# Patient Record
Sex: FEMALE | Race: WHITE | NOT HISPANIC OR LATINO | Employment: UNEMPLOYED | URBAN - METROPOLITAN AREA
[De-identification: names, ages, dates, MRNs, and addresses within clinical notes are randomized per-mention and may not be internally consistent; named-entity substitution may affect disease eponyms.]

---

## 2017-12-04 PROBLEM — Z20.2 VENEREAL DISEASE CONTACT: Status: ACTIVE | Noted: 2017-12-04

## 2019-02-01 ENCOUNTER — HOSPITAL ENCOUNTER (EMERGENCY)
Facility: HOSPITAL | Age: 24
Discharge: HOME | End: 2019-02-01
Attending: EMERGENCY MEDICINE
Payer: MEDICAID

## 2019-02-01 VITALS
DIASTOLIC BLOOD PRESSURE: 90 MMHG | BODY MASS INDEX: 24.55 KG/M2 | SYSTOLIC BLOOD PRESSURE: 109 MMHG | WEIGHT: 130 LBS | TEMPERATURE: 99 F | OXYGEN SATURATION: 100 % | HEART RATE: 110 BPM | RESPIRATION RATE: 18 BRPM | HEIGHT: 61 IN

## 2019-02-01 DIAGNOSIS — R53.83 FATIGUE, UNSPECIFIED TYPE: Primary | ICD-10-CM

## 2019-02-01 DIAGNOSIS — M79.10 MYALGIA: ICD-10-CM

## 2019-02-01 LAB
ALBUMIN SERPL BCP-MCNC: 3.9 G/DL
ALP SERPL-CCNC: 63 U/L
ALT SERPL W/O P-5'-P-CCNC: 28 U/L
ANION GAP SERPL CALC-SCNC: 11 MMOL/L
ANISOCYTOSIS BLD QL SMEAR: SLIGHT
AST SERPL-CCNC: 28 U/L
B-HCG UR QL: NEGATIVE
BASOPHILS # BLD AUTO: 0.02 K/UL
BASOPHILS NFR BLD: 0.5 %
BILIRUB SERPL-MCNC: 1.1 MG/DL
BUN SERPL-MCNC: 12 MG/DL
BUN SERPL-MCNC: 9 MG/DL (ref 6–30)
CALCIUM SERPL-MCNC: 9.5 MG/DL
CHLORIDE SERPL-SCNC: 105 MMOL/L
CHLORIDE SERPL-SCNC: 113 MMOL/L (ref 95–110)
CO2 SERPL-SCNC: 20 MMOL/L
CREAT SERPL-MCNC: 0.3 MG/DL (ref 0.5–1.4)
CREAT SERPL-MCNC: 0.7 MG/DL
CTP QC/QA: YES
CTP QC/QA: YES
DIFFERENTIAL METHOD: ABNORMAL
EOSINOPHIL # BLD AUTO: 0 K/UL
EOSINOPHIL NFR BLD: 1 %
ERYTHROCYTE [DISTWIDTH] IN BLOOD BY AUTOMATED COUNT: 13.6 %
EST. GFR  (AFRICAN AMERICAN): >60 ML/MIN/1.73 M^2
EST. GFR  (NON AFRICAN AMERICAN): >60 ML/MIN/1.73 M^2
GLUCOSE SERPL-MCNC: 69 MG/DL (ref 70–110)
GLUCOSE SERPL-MCNC: 97 MG/DL
HCT VFR BLD AUTO: 45.4 %
HCT VFR BLD CALC: 33 %PCV (ref 36–54)
HETEROPH AB SERPL QL IA: NEGATIVE
HGB BLD-MCNC: 15.3 G/DL
HIV1+2 IGG SERPL QL IA.RAPID: NEGATIVE
IMM GRANULOCYTES # BLD AUTO: 0 K/UL
IMM GRANULOCYTES NFR BLD AUTO: 0 %
LYMPHOCYTES # BLD AUTO: 0.6 K/UL
LYMPHOCYTES NFR BLD: 14 %
MCH RBC QN AUTO: 32 PG
MCHC RBC AUTO-ENTMCNC: 33.7 G/DL
MCV RBC AUTO: 95 FL
MONOCYTES # BLD AUTO: 0.4 K/UL
MONOCYTES NFR BLD: 8.9 %
NEUTROPHILS # BLD AUTO: 3.1 K/UL
NEUTROPHILS NFR BLD: 75.6 %
NRBC BLD-RTO: 0 /100 WBC
PLATELET # BLD AUTO: 135 K/UL
PLATELET BLD QL SMEAR: ABNORMAL
PMV BLD AUTO: 11.4 FL
POC IONIZED CALCIUM: 0.75 MMOL/L (ref 1.06–1.42)
POC MOLECULAR INFLUENZA A AGN: NEGATIVE
POC MOLECULAR INFLUENZA B AGN: NEGATIVE
POC TCO2 (MEASURED): 19 MMOL/L (ref 23–29)
POTASSIUM BLD-SCNC: 3 MMOL/L (ref 3.5–5.1)
POTASSIUM SERPL-SCNC: 3.5 MMOL/L
PROT SERPL-MCNC: 8.1 G/DL
RBC # BLD AUTO: 4.78 M/UL
SAMPLE: ABNORMAL
SODIUM BLD-SCNC: 142 MMOL/L (ref 136–145)
SODIUM SERPL-SCNC: 136 MMOL/L
TSH SERPL DL<=0.005 MIU/L-ACNC: 0.62 UIU/ML
WBC # BLD AUTO: 4.06 K/UL

## 2019-02-01 PROCEDURE — 99282 PR EMERGENCY DEPT VISIT,LEVEL II: ICD-10-PCS | Mod: ,,, | Performed by: PHYSICIAN ASSISTANT

## 2019-02-01 PROCEDURE — 84443 ASSAY THYROID STIM HORMONE: CPT

## 2019-02-01 PROCEDURE — 99282 EMERGENCY DEPT VISIT SF MDM: CPT | Mod: ,,, | Performed by: PHYSICIAN ASSISTANT

## 2019-02-01 PROCEDURE — 96360 HYDRATION IV INFUSION INIT: CPT

## 2019-02-01 PROCEDURE — 99284 EMERGENCY DEPT VISIT MOD MDM: CPT | Mod: 25

## 2019-02-01 PROCEDURE — 80053 COMPREHEN METABOLIC PANEL: CPT

## 2019-02-01 PROCEDURE — 85025 COMPLETE CBC W/AUTO DIFF WBC: CPT

## 2019-02-01 PROCEDURE — 86308 HETEROPHILE ANTIBODY SCREEN: CPT

## 2019-02-01 PROCEDURE — 25000003 PHARM REV CODE 250: Performed by: EMERGENCY MEDICINE

## 2019-02-01 PROCEDURE — 81025 URINE PREGNANCY TEST: CPT | Performed by: EMERGENCY MEDICINE

## 2019-02-01 PROCEDURE — 86703 HIV-1/HIV-2 1 RESULT ANTBDY: CPT

## 2019-02-01 RX ORDER — IBUPROFEN 400 MG/1
400 TABLET ORAL
Status: COMPLETED | OUTPATIENT
Start: 2019-02-01 | End: 2019-02-01

## 2019-02-01 RX ORDER — ACETAMINOPHEN 500 MG
1000 TABLET ORAL
Status: COMPLETED | OUTPATIENT
Start: 2019-02-01 | End: 2019-02-01

## 2019-02-01 RX ADMIN — IBUPROFEN 400 MG: 400 TABLET, FILM COATED ORAL at 06:02

## 2019-02-01 RX ADMIN — SODIUM CHLORIDE 1000 ML: 0.9 INJECTION, SOLUTION INTRAVENOUS at 09:02

## 2019-02-01 RX ADMIN — ACETAMINOPHEN 1000 MG: 500 TABLET ORAL at 06:02

## 2019-02-01 NOTE — ED PROVIDER NOTES
"Encounter Date: 2/1/2019       History     Chief Complaint   Patient presents with    Generalized Body Aches     Pt also with random vag "spotting".  Pt states that she has been taking extra iron to help.     22 yo f, healthy, c/o severe fatigue x 3 weeks, falling asleep, feels like she has "no energy" to do anything, also with sensation of tightness in her throat, easy bruising, DURAN.  Vaginal spotting reported to triage nurse but pt reports no menses x 3 years since getting her IUD.   Pt also reports worsening severe myalgias and bone pain, particularly in her LE.  No recent travel.      The history is provided by the patient.     Review of patient's allergies indicates:  No Known Allergies  Past Medical History:   Diagnosis Date    Gestational diabetes      Past Surgical History:   Procedure Laterality Date    BACK SURGERY Bilateral 2012    cyst removal in [facility]     History reviewed. No pertinent family history.  Social History     Tobacco Use    Smoking status: Current Some Day Smoker   Substance Use Topics    Alcohol use: Yes     Comment: socially    Drug use: No     Review of Systems   Constitutional: Positive for fatigue. Negative for appetite change, chills, diaphoresis, fever and unexpected weight change.   HENT: Positive for sore throat. Negative for trouble swallowing.    Respiratory: Positive for shortness of breath. Negative for cough.    Cardiovascular: Negative for chest pain and leg swelling.   Gastrointestinal: Negative for abdominal pain, diarrhea and vomiting.   Genitourinary: Negative for difficulty urinating.   Musculoskeletal: Positive for back pain and myalgias. Negative for joint swelling.   Skin: Negative for color change, pallor and rash.   Neurological: Negative for seizures, syncope, speech difficulty, numbness and headaches.   All other systems reviewed and are negative.      Physical Exam     Initial Vitals [02/01/19 1501]   BP Pulse Resp Temp SpO2   112/85 108 20 98.5 °F (36.9 " °C) 98 %      MAP       --         Physical Exam    Nursing note and vitals reviewed.  Constitutional: Vital signs are normal. She appears well-developed and well-nourished.  Non-toxic appearance. She does not appear ill.   HENT:   Head: Normocephalic and atraumatic.   Nose: Nose normal.   Mouth/Throat: Oropharynx is clear and moist.   Eyes: Conjunctivae and lids are normal. Pupils are equal, round, and reactive to light.   Neck: Normal range of motion. Neck supple.   Cardiovascular: Normal rate, regular rhythm, S1 normal, S2 normal and normal heart sounds.   No murmur heard.  Pulmonary/Chest: Breath sounds normal. No respiratory distress. She has no wheezes. She has no rhonchi. She has no rales.   Abdominal: Soft. Normal appearance. She exhibits no ascites and no mass. There is no tenderness.   Musculoskeletal: Normal range of motion. She exhibits no edema.   Neurological: She is alert and oriented to person, place, and time. She has normal strength. No cranial nerve deficit.   Skin: Skin is warm, dry and intact. No cyanosis or erythema. No pallor. Nails show no clubbing.   Ecchymotic lesion to R flank/hip   Psychiatric: She has a normal mood and affect. Her speech is normal and behavior is normal. She is not actively hallucinating. She is attentive.         ED Course   Procedures  Labs Reviewed   CBC W/ AUTO DIFFERENTIAL - Abnormal; Notable for the following components:       Result Value    MCH 32.0 (*)     Platelets 135 (*)     Lymph # 0.6 (*)     Gran% 75.6 (*)     Lymph% 14.0 (*)     All other components within normal limits   COMPREHENSIVE METABOLIC PANEL - Abnormal; Notable for the following components:    CO2 20 (*)     Total Bilirubin 1.1 (*)     All other components within normal limits   ISTAT PROCEDURE - Abnormal; Notable for the following components:    POC Glucose 69 (*)     POC Creatinine 0.3 (*)     POC Potassium 3.0 (*)     POC Chloride 113 (*)     POC TCO2 (MEASURED) 19 (*)     POC Ionized Calcium  0.75 (*)     POC Hematocrit 33 (*)     All other components within normal limits   HETEROPHILE AB SCREEN   TSH   RAPID HIV   COMPREHENSIVE METABOLIC PANEL   RAPID HIV   POCT URINE PREGNANCY   POCT INFLUENZA A/B MOLECULAR   ISTAT CHEM8     Results for orders placed or performed during the hospital encounter of 02/01/19   Heterophile Ab Screen   Result Value Ref Range    Monospot Negative Negative   CBC auto differential   Result Value Ref Range    WBC 4.06 3.90 - 12.70 K/uL    RBC 4.78 4.00 - 5.40 M/uL    Hemoglobin 15.3 12.0 - 16.0 g/dL    Hematocrit 45.4 37.0 - 48.5 %    MCV 95 82 - 98 fL    MCH 32.0 (H) 27.0 - 31.0 pg    MCHC 33.7 32.0 - 36.0 g/dL    RDW 13.6 11.5 - 14.5 %    Platelets 135 (L) 150 - 350 K/uL    MPV 11.4 9.2 - 12.9 fL    Immature Granulocytes 0.0 0.0 - 0.5 %    Gran # (ANC) 3.1 1.8 - 7.7 K/uL    Immature Grans (Abs) 0.00 0.00 - 0.04 K/uL    Lymph # 0.6 (L) 1.0 - 4.8 K/uL    Mono # 0.4 0.3 - 1.0 K/uL    Eos # 0.0 0.0 - 0.5 K/uL    Baso # 0.02 0.00 - 0.20 K/uL    nRBC 0 0 /100 WBC    Gran% 75.6 (H) 38.0 - 73.0 %    Lymph% 14.0 (L) 18.0 - 48.0 %    Mono% 8.9 4.0 - 15.0 %    Eosinophil% 1.0 0.0 - 8.0 %    Basophil% 0.5 0.0 - 1.9 %    Platelet Estimate Appears normal     Aniso Slight     Differential Method Automated    TSH   Result Value Ref Range    TSH 0.616 0.400 - 4.000 uIU/mL   Comprehensive metabolic panel   Result Value Ref Range    Sodium 136 136 - 145 mmol/L    Potassium 3.5 3.5 - 5.1 mmol/L    Chloride 105 95 - 110 mmol/L    CO2 20 (L) 23 - 29 mmol/L    Glucose 97 70 - 110 mg/dL    BUN, Bld 12 6 - 20 mg/dL    Creatinine 0.7 0.5 - 1.4 mg/dL    Calcium 9.5 8.7 - 10.5 mg/dL    Total Protein 8.1 6.0 - 8.4 g/dL    Albumin 3.9 3.5 - 5.2 g/dL    Total Bilirubin 1.1 (H) 0.1 - 1.0 mg/dL    Alkaline Phosphatase 63 55 - 135 U/L    AST 28 10 - 40 U/L    ALT 28 10 - 44 U/L    Anion Gap 11 8 - 16 mmol/L    eGFR if African American >60.0 >60 mL/min/1.73 m^2    eGFR if non African American >60.0 >60  "mL/min/1.73 m^2   Rapid HIV   Result Value Ref Range    HIV Rapid Testing Negative Negative   POCT urine pregnancy   Result Value Ref Range    POC Preg Test, Ur Negative Negative     Acceptable Yes    POCT Influenza A/B Molecular   Result Value Ref Range    POC Molecular Influenza A Ag Negative Negative, Not Reported    POC Molecular Influenza B Ag Negative Negative, Not Reported     Acceptable Yes    ISTAT PROCEDURE   Result Value Ref Range    POC Glucose 69 (L) 70 - 110 mg/dL    POC BUN 9 6 - 30 mg/dL    POC Creatinine 0.3 (L) 0.5 - 1.4 mg/dL    POC Sodium 142 136 - 145 mmol/L    POC Potassium 3.0 (L) 3.5 - 5.1 mmol/L    POC Chloride 113 (H) 95 - 110 mmol/L    POC TCO2 (MEASURED) 19 (L) 23 - 29 mmol/L    POC Ionized Calcium 0.75 (L) 1.06 - 1.42 mmol/L    POC Hematocrit 33 (L) 36 - 54 %PCV    Sample NONA      Vitals:    02/01/19 1501 02/01/19 1805   BP: 112/85 (!) 109/90   BP Location:  Right arm   Patient Position:  Sitting   Pulse: 108 110   Resp: 20 18   Temp: 98.5 °F (36.9 °C) 99.1 °F (37.3 °C)   TempSrc: Oral    SpO2: 98% 100%   Weight: 59 kg (130 lb)    Height: 5' 1" (1.549 m)             Imaging Results    None          Medical Decision Making:   Initial Assessment:   22 yo healthy f    Here with multiple complaints, unclear if they are related  She is well-appearing on exam and besides mild tachycardia, has completely normal exam  Differential Diagnosis:   Considerations would include anemia, thrombocytopenia, electrolyte disturbance, leukemia, viral infection like mono or influenza  ED Management:  Influenza  Monospot  CBC  CMP  Reassess    8:09 PM  Hypokalemia on ISTAT, CBC with low lymphocyte count so HIV rapid test added on as 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 She anticipated unremarkable work up and expects for me to be able to safely discharge the patient home with instructions to follow up closely with primary care. The patient has a myriad of complaints, but mostly she is c/o fatigue, body aches, and finds that she is easily bruises. Slightly elevated HR noted. Her work up is unremarkable. She does reports feeling better after IV fluids and after being informed of her test results. I advised the patient to follow up closely with her primary care physician. I advised her to return to the ER if worse in any way.                   Clinical Impression:   The primary encounter diagnosis was Fatigue, unspecified type. A diagnosis of Myalgia was also pertinent to this visit.                             Perry Austin PA-C  02/01/19 [id number]

## 2019-02-01 NOTE — ED TRIAGE NOTES
"Pt c/o generalized weakness x 2wks.  States "I just don't have any energy".  "I am also freezing and I am never cold".  Pt also c/o bruising easily and feeling of throat is swollen.  Pt states "everything hurts".    "

## 2025-05-04 ENCOUNTER — HOSPITAL ENCOUNTER (EMERGENCY)
Facility: HOSPITAL | Age: 30
Discharge: HOME | End: 2025-05-04
Attending: EMERGENCY MEDICINE
Payer: MEDICAID

## 2025-05-04 VITALS
BODY MASS INDEX: 26.61 KG/M2 | WEIGHT: 144.63 LBS | TEMPERATURE: 98 F | DIASTOLIC BLOOD PRESSURE: 63 MMHG | RESPIRATION RATE: 19 BRPM | HEART RATE: 81 BPM | SYSTOLIC BLOOD PRESSURE: 114 MMHG | OXYGEN SATURATION: 99 % | HEIGHT: 62 IN

## 2025-05-04 DIAGNOSIS — N83.202 OVARIAN CYST, LEFT: Primary | ICD-10-CM

## 2025-05-04 LAB
ABSOLUTE EOSINOPHIL (OHS): 0.1 K/UL
ABSOLUTE MONOCYTE (OHS): 0.48 K/UL (ref 0.3–1)
ABSOLUTE NEUTROPHIL COUNT (OHS): 4.79 K/UL (ref 1.8–7.7)
ALBUMIN SERPL BCP-MCNC: 3.7 G/DL (ref 3.5–5.2)
ALP SERPL-CCNC: 56 UNIT/L (ref 40–150)
ALT SERPL W/O P-5'-P-CCNC: 18 UNIT/L (ref 10–44)
ANION GAP (OHS): 12 MMOL/L (ref 8–16)
AST SERPL-CCNC: 16 UNIT/L (ref 11–45)
B-HCG UR QL: NEGATIVE
BASOPHILS # BLD AUTO: 0.03 K/UL
BASOPHILS NFR BLD AUTO: 0.5 %
BILIRUB SERPL-MCNC: 0.5 MG/DL (ref 0.1–1)
BILIRUB UR QL STRIP.AUTO: NEGATIVE
BUN SERPL-MCNC: 9 MG/DL (ref 6–20)
CALCIUM SERPL-MCNC: 8.9 MG/DL (ref 8.7–10.5)
CHLORIDE SERPL-SCNC: 104 MMOL/L (ref 95–110)
CLARITY UR: CLEAR
CO2 SERPL-SCNC: 22 MMOL/L (ref 23–29)
COLOR UR AUTO: YELLOW
CREAT SERPL-MCNC: 0.6 MG/DL (ref 0.5–1.4)
ERYTHROCYTE [DISTWIDTH] IN BLOOD BY AUTOMATED COUNT: 13.2 % (ref 11.5–14.5)
GFR SERPLBLD CREATININE-BSD FMLA CKD-EPI: >60 ML/MIN/1.73/M2
GLUCOSE SERPL-MCNC: 93 MG/DL (ref 70–110)
GLUCOSE UR QL STRIP: NEGATIVE
HCT VFR BLD AUTO: 34.5 % (ref 37–48.5)
HCV AB SERPL QL IA: NEGATIVE
HGB BLD-MCNC: 12 GM/DL (ref 12–16)
HGB UR QL STRIP: ABNORMAL
HIV 1+2 AB+HIV1 P24 AG SERPL QL IA: NEGATIVE
IMM GRANULOCYTES # BLD AUTO: 0.02 K/UL (ref 0–0.04)
IMM GRANULOCYTES NFR BLD AUTO: 0.3 % (ref 0–0.5)
KETONES UR QL STRIP: NEGATIVE
LEUKOCYTE ESTERASE UR QL STRIP: NEGATIVE
LYMPHOCYTES # BLD AUTO: 1.12 K/UL (ref 1–4.8)
MCH RBC QN AUTO: 30.9 PG (ref 27–31)
MCHC RBC AUTO-ENTMCNC: 34.8 G/DL (ref 32–36)
MCV RBC AUTO: 89 FL (ref 82–98)
MICROSCOPIC COMMENT: NORMAL
NITRITE UR QL STRIP: NEGATIVE
NUCLEATED RBC (/100WBC) (OHS): 0 /100 WBC
PH UR STRIP: 6 [PH]
PLATELET # BLD AUTO: 169 K/UL (ref 150–450)
PMV BLD AUTO: 11.4 FL (ref 9.2–12.9)
POTASSIUM SERPL-SCNC: 3.8 MMOL/L (ref 3.5–5.1)
PROT SERPL-MCNC: 8.3 GM/DL (ref 6–8.4)
PROT UR QL STRIP: ABNORMAL
RBC # BLD AUTO: 3.88 M/UL (ref 4–5.4)
RBC #/AREA URNS AUTO: 1 /HPF (ref 0–4)
RELATIVE EOSINOPHIL (OHS): 1.5 %
RELATIVE LYMPHOCYTE (OHS): 17.1 % (ref 18–48)
RELATIVE MONOCYTE (OHS): 7.3 % (ref 4–15)
RELATIVE NEUTROPHIL (OHS): 73.3 % (ref 38–73)
SODIUM SERPL-SCNC: 138 MMOL/L (ref 136–145)
SP GR UR STRIP: 1.03
SQUAMOUS #/AREA URNS AUTO: 1 /HPF
UROBILINOGEN UR STRIP-ACNC: NEGATIVE EU/DL
WBC # BLD AUTO: 6.54 K/UL (ref 3.9–12.7)

## 2025-05-04 PROCEDURE — 81003 URINALYSIS AUTO W/O SCOPE: CPT | Performed by: EMERGENCY MEDICINE

## 2025-05-04 PROCEDURE — 87389 HIV-1 AG W/HIV-1&-2 AB AG IA: CPT | Performed by: EMERGENCY MEDICINE

## 2025-05-04 PROCEDURE — 99285 EMERGENCY DEPT VISIT HI MDM: CPT | Mod: 25

## 2025-05-04 PROCEDURE — 86803 HEPATITIS C AB TEST: CPT | Performed by: EMERGENCY MEDICINE

## 2025-05-04 PROCEDURE — 80053 COMPREHEN METABOLIC PANEL: CPT | Performed by: EMERGENCY MEDICINE

## 2025-05-04 PROCEDURE — 81025 URINE PREGNANCY TEST: CPT | Performed by: EMERGENCY MEDICINE

## 2025-05-04 PROCEDURE — 63600175 PHARM REV CODE 636 W HCPCS: Performed by: EMERGENCY MEDICINE

## 2025-05-04 PROCEDURE — 85025 COMPLETE CBC W/AUTO DIFF WBC: CPT | Performed by: EMERGENCY MEDICINE

## 2025-05-04 PROCEDURE — 96374 THER/PROPH/DIAG INJ IV PUSH: CPT

## 2025-05-04 RX ORDER — KETOROLAC TROMETHAMINE 30 MG/ML
15 INJECTION, SOLUTION INTRAMUSCULAR; INTRAVENOUS
Status: DISCONTINUED | OUTPATIENT
Start: 2025-05-04 | End: 2025-05-04 | Stop reason: HOSPADM

## 2025-05-04 RX ORDER — MORPHINE SULFATE 4 MG/ML
4 INJECTION, SOLUTION INTRAMUSCULAR; INTRAVENOUS
Refills: 0 | Status: COMPLETED | OUTPATIENT
Start: 2025-05-04 | End: 2025-05-04

## 2025-05-04 RX ORDER — HYDROCODONE BITARTRATE AND ACETAMINOPHEN 10; 325 MG/1; MG/1
1 TABLET ORAL EVERY 6 HOURS PRN
Qty: 12 TABLET | Refills: 0 | Status: SHIPPED | OUTPATIENT
Start: 2025-05-04

## 2025-05-04 RX ORDER — HYDROCODONE BITARTRATE AND ACETAMINOPHEN 10; 325 MG/1; MG/1
1 TABLET ORAL EVERY 6 HOURS PRN
Qty: 12 TABLET | Refills: 0 | Status: SHIPPED | OUTPATIENT
Start: 2025-05-04 | End: 2025-05-04

## 2025-05-04 RX ORDER — MORPHINE SULFATE 4 MG/ML
2 INJECTION, SOLUTION INTRAMUSCULAR; INTRAVENOUS
Refills: 0 | Status: COMPLETED | OUTPATIENT
Start: 2025-05-04 | End: 2025-05-04

## 2025-05-04 RX ADMIN — MORPHINE SULFATE 2 MG: 4 INJECTION INTRAVENOUS at 05:05

## 2025-05-04 RX ADMIN — MORPHINE SULFATE 4 MG: 4 INJECTION INTRAVENOUS at 05:05

## 2025-05-04 NOTE — ED PROVIDER NOTES
SCRIBE #1 NOTE: I, Filiberto Dunaway, am scribing for, and in the presence of, Andrzej Shoemaker MD. I have scribed the HPI, ROS, and PE.     SCRIBE #2 NOTE: I, Prasanth Hoffmann, am scribing for, and in the presence of,  Samir Bermudez Jr., MD. I have scribed the remaining portions of the note not scribed by Scribe #1.      History     Chief Complaint   Patient presents with    Groin Pain     Patient presents to ED with c/o right groin pain x 1 week. Patient states she believes she may have had an ovarian cyst rupture on the right side during the week.     Review of patient's allergies indicates:  No Known Allergies      History of Present Illness     HPI    5/4/2025, 2:40 PM  History obtained from the patient and medical records      History of Present Illness: Aline Benavidez is a 29 y.o. female patient with a PMHx of asthma who presents to the Emergency Department for evaluation of right-sided groin pain which onset within the last day. Pt recently had a ruptured cyst a week ago. Pt is currently on her menstrual period. Pain worsens with movement. Symptoms are constant and moderate in severity. Patient denies any abnormal discharge, N/V/D, HA, dizziness, fever, and all other sxs at this time. No further complaints or concerns at this time.       Arrival mode: Personal Transportation    PCP: Henrique Garcia MD        Past Medical History:  Past Medical History:   Diagnosis Date    Asthma     Gestational diabetes        Past Surgical History:  Past Surgical History:   Procedure Laterality Date    BACK SURGERY Bilateral 2012    cyst removal in [facility]         Family History:  No family history on file.    Social History:  Social History     Tobacco Use    Smoking status: Some Days    Smokeless tobacco: Never   Substance and Sexual Activity    Alcohol use: Yes     Comment: couple of pints of vodka daily x 1 year    Drug use: Not Currently     Types: IV     Comment: H/o IV drug use - has not used IV drug use in 1 year     Sexual activity: Yes     Partners: Male        Review of Systems     Review of Systems   Constitutional:  Negative for chills and fever.   HENT:  Negative for congestion and sore throat.    Respiratory:  Negative for cough and shortness of breath.    Cardiovascular:  Negative for chest pain.   Gastrointestinal:  Negative for abdominal pain, diarrhea, nausea and vomiting.   Genitourinary:  Negative for dysuria and vaginal discharge.        (+) right-sided groin pain   Musculoskeletal:  Negative for back pain.   Skin:  Negative for rash.   Neurological:  Negative for dizziness, weakness, light-headedness and headaches.   Hematological:  Does not bruise/bleed easily.   All other systems reviewed and are negative.     Physical Exam     Initial Vitals [05/04/25 1400]   BP Pulse Resp Temp SpO2   123/77 89 18 98.1 °F (36.7 °C) 100 %      MAP       --          Physical Exam  Nursing Notes and Vital Signs Reviewed.  Constitutional: Patient is in no acute distress. Well-developed and well-nourished.  Head: Atraumatic. Normocephalic.  Eyes: PERRL. EOM intact. Conjunctivae are not pale. No scleral icterus.  ENT: Mucous membranes are moist. Oropharynx is clear and symmetric.    Neck: Supple. Full ROM. No lymphadenopathy.  Cardiovascular: Regular rate. Regular rhythm. No murmurs, rubs, or gallops. Distal pulses are 2+ and symmetric.  Pulmonary/Chest: No respiratory distress. Clear to auscultation bilaterally. No wheezing or rales.  Abdominal: Soft and non-distended.  There is no tenderness.  No rebound, guarding, or rigidity. Good bowel sounds.  Genitourinary: No CVA tenderness. Right groin tenderness; there is no lymphadenopathy.  Musculoskeletal: Moves all extremities. No obvious deformities. No edema. No calf tenderness.  Skin: Warm and dry.  Neurological:  Alert, awake, and appropriate.  Normal speech.  No acute focal neurological deficits are appreciated.  Psychiatric: Normal affect. Good eye contact. Appropriate in  "content.       ED Course   Procedures  ED Vital Signs:  Vitals:    05/04/25 1400 05/04/25 1500 05/04/25 1727 05/04/25 1732   BP: 123/77 116/74  114/63   Pulse: 89 95  81   Resp: 18  16 16   Temp: 98.1 °F (36.7 °C)      TempSrc: Oral      SpO2: 100% 100%  99%   Weight: 65.6 kg (144 lb 9.6 oz)      Height: 5' 2" (1.575 m)       05/04/25 1752   BP:    Pulse:    Resp: 19   Temp:    TempSrc:    SpO2:    Weight:    Height:        Abnormal Lab Results:  Labs Reviewed   COMPREHENSIVE METABOLIC PANEL - Abnormal       Result Value    Sodium 138      Potassium 3.8      Chloride 104      CO2 22 (*)     Glucose 93      BUN 9      Creatinine 0.6      Calcium 8.9      Protein Total 8.3      Albumin 3.7      Bilirubin Total 0.5      ALP 56      AST 16      ALT 18      Anion Gap 12      eGFR >60     URINALYSIS, REFLEX TO URINE CULTURE - Abnormal    Color, UA Yellow      Appearance, UA Clear      pH, UA 6.0      Spec Grav UA 1.030      Protein, UA Trace (*)     Glucose, UA Negative      Ketones, UA Negative      Bilirubin, UA Negative      Blood, UA 2+ (*)     Nitrites, UA Negative      Urobilinogen, UA Negative      Leukocyte Esterase, UA Negative     CBC WITH DIFFERENTIAL - Abnormal    WBC 6.54      RBC 3.88 (*)     HGB 12.0      HCT 34.5 (*)     MCV 89      MCH 30.9      MCHC 34.8      RDW 13.2      Platelet Count 169      MPV 11.4      Nucleated RBC 0      Neut % 73.3 (*)     Lymph % 17.1 (*)     Mono % 7.3      Eos % 1.5      Basophil % 0.5      Imm Grans % 0.3      Neut # 4.79      Lymph # 1.12      Mono # 0.48      Eos # 0.10      Baso # 0.03      Imm Grans # 0.02     HEPATITIS C ANTIBODY - Normal    Hep C Ab Interp Negative     HIV 1 / 2 ANTIBODY - Normal    HIV 1/2 Ag/Ab Negative     PREGNANCY TEST, URINE RAPID - Normal    hCG Qualitative, Urine Negative     CBC W/ AUTO DIFFERENTIAL    Narrative:     The following orders were created for panel order CBC Auto Differential.  Procedure                               Abnormality  "        Status                     ---------                               -----------         ------                     CBC with Differential[[id number]]       Abnormal            Final result                 Please view results for these tests on the individual orders.   URINALYSIS MICROSCOPIC    RBC, UA 1      Squamous Epithelial Cells, UA 1      Microscopic Comment       HEP C VIRUS HOLD SPECIMEN        All Lab Results:  Results for orders placed or performed during the hospital encounter of 05/04/25   Hepatitis C Antibody    Collection Time: 05/04/25  3:02 PM   Result Value Ref Range    Hep C Ab Interp Negative Negative   HIV 1/2 Ag/Ab (4th Gen)    Collection Time: 05/04/25  3:02 PM   Result Value Ref Range    HIV 1/2 Ag/Ab Negative Negative   Comprehensive Metabolic Panel    Collection Time: 05/04/25  3:02 PM   Result Value Ref Range    Sodium 138 136 - 145 mmol/L    Potassium 3.8 3.5 - 5.1 mmol/L    Chloride 104 95 - 110 mmol/L    CO2 22 (L) 23 - 29 mmol/L    Glucose 93 70 - 110 mg/dL    BUN 9 6 - 20 mg/dL    Creatinine 0.6 0.5 - 1.4 mg/dL    Calcium 8.9 8.7 - 10.5 mg/dL    Protein Total 8.3 6.0 - 8.4 gm/dL    Albumin 3.7 3.5 - 5.2 g/dL    Bilirubin Total 0.5 0.1 - 1.0 mg/dL    ALP 56 40 - 150 unit/L    AST 16 11 - 45 unit/L    ALT 18 10 - 44 unit/L    Anion Gap 12 8 - 16 mmol/L    eGFR >60 >60 mL/min/1.73/m2   Urinalysis, Reflex to Urine Culture Urine, Clean Catch    Collection Time: 05/04/25  3:02 PM    Specimen: Urine   Result Value Ref Range    Color, UA Yellow Straw, Svitlana, Yellow, Light-Orange    Appearance, UA Clear Clear    pH, UA 6.0 5.0 - 8.0    Spec Grav UA 1.030 1.005 - 1.030    Protein, UA Trace (A) Negative    Glucose, UA Negative Negative    Ketones, UA Negative Negative    Bilirubin, UA Negative Negative    Blood, UA 2+ (A) Negative    Nitrites, UA Negative Negative    Urobilinogen, UA Negative <2.0 EU/dL    Leukocyte Esterase, UA Negative Negative   Pregnancy, urine rapid    Collection Time:  05/04/25  3:02 PM   Result Value Ref Range    hCG Qualitative, Urine Negative Negative   CBC with Differential    Collection Time: 05/04/25  3:02 PM   Result Value Ref Range    WBC 6.54 3.90 - 12.70 K/uL    RBC 3.88 (L) 4.00 - 5.40 M/uL    HGB 12.0 12.0 - 16.0 gm/dL    HCT 34.5 (L) 37.0 - 48.5 %    MCV 89 82 - 98 fL    MCH 30.9 27.0 - 31.0 pg    MCHC 34.8 32.0 - 36.0 g/dL    RDW 13.2 11.5 - 14.5 %    Platelet Count 169 150 - 450 K/uL    MPV 11.4 9.2 - 12.9 fL    Nucleated RBC 0 <=0 /100 WBC    Neut % 73.3 (H) 38 - 73 %    Lymph % 17.1 (L) 18 - 48 %    Mono % 7.3 4 - 15 %    Eos % 1.5 <=8 %    Basophil % 0.5 <=1.9 %    Imm Grans % 0.3 0.0 - 0.5 %    Neut # 4.79 1.8 - 7.7 K/uL    Lymph # 1.12 1 - 4.8 K/uL    Mono # 0.48 0.3 - 1 K/uL    Eos # 0.10 <=0.5 K/uL    Baso # 0.03 <=0.2 K/uL    Imm Grans # 0.02 0.00 - 0.04 K/uL   Urinalysis Microscopic    Collection Time: 05/04/25  3:02 PM   Result Value Ref Range    RBC, UA 1 0 - 4 /HPF    Squamous Epithelial Cells, UA 1 /HPF    Microscopic Comment         Imaging Results:  Imaging Results              US Pelvis Comp with Transvag NON-OB (xpd) (Final result)  Result time 05/04/25 17:49:01   Procedure changed from US Pelvis Complete Non OB     Final result by Nicole Brower MD (05/04/25 17:49:01)                   Impression:      Left ovarian small predominantly echogenic lesion 18 mm may be a dermoid and scant ascites in the right adnexa    Finalized on: 5/4/2025 5:49 PM By:  Nicole Brower MD  [facility]# [id number]      2025-05-04 17:51:06.049     [facility]               Narrative:    EXAM: US PELVIS COMP WITH TRANSVAG NON-OB (XPD)    CLINICAL HISTORY: Right pelvic pain    FINDINGS:  Uterine length measurement 7 cm.  Endometrial stripe thickness 4 mm.  Right ovary unremarkable with positive Doppler flow.  Small volume adjacent ascites.  Left ovary 18 mm echogenic focus with no posterior acoustic shadowing and positive parenchymal Doppler flow.  Technique is transabdominal and  transvaginal                                         CT Renal Stone Study ABD Pelvis WO (Final result)  Result time 05/04/25 15:53:54      Final result by Nicole Brower MD (05/04/25 15:53:54)                   Impression:       Negative for obstructive uropathy    Bilateral adnexal lesions which could be further characterized by sonography    All CT scans at [this location] are performed using dose modulation techniques as appropriate to a performed exam including the following: automated exposure control; adjustment of the mA and/or kV according to patient size (this includes techniques or standardized protocols for targeted exams where dose is matched to indication / reason for exam; i.e. extremities or head); use of iterative reconstruction technique.    Finalized on: 5/4/2025 3:53 PM By:  Nicole Brower MD  [facility]# [id number]      2025-05-04 15:55:59.997     [facility]               Narrative:    EXAM: CT RENAL STONE STUDY ABD PELVIS WO    CLINICAL INDICATION:  Flank pain kidney stone    TECHNIQUE: Axial and multiplanar 2-D reformations provided  noncontrast imaging    FINDINGS:  Lung bases well aerated    Kidneys without hydronephrosis or sizable calcification.  No hydroureter seen.  Urinary bladder decompressed    Left adnexal 2 cm cystic and surrounding soft tissue density.  Right ovary relatively hyperdense.    No signs of appendicitis or obstructive bowel findings.    Unenhanced liver pancreas and spleen unremarkable.  Gallbladder present                                                    The Emergency Provider reviewed the vital signs and test results, which are outlined above.     ED Discussion     4:00 PM: Dr. Shoemaker transfers care of patient to Dr. Bermudez pending lab and imaging results.    6:12 PM: Reassessed pt at this time. Discussed with patient and/or family/caretaker all pertinent ED information and results. Discussed pt dx and plan of tx. Gave the patient all f/u and return to the ED instructions.  All questions and concerns were addressed at this time. Patient and/or family/caretaker expresses understanding of information and instructions, and is comfortable with plan to discharge. Pt is stable for discharge.     I discussed with patient and/or family/caretaker that evaluation in the ED does not suggest any emergent or life threatening medical conditions requiring immediate intervention beyond what was provided in the ED, and I believe patient is safe for discharge.  Regardless, an unremarkable evaluation in the ED does not preclude the development or presence of a serious of life threatening condition. As such, I instructed that the patient is to return immediately for any worsening or change in current symptoms.           Medical Decision Making  Differential diagnosis:  Left lower quadrant pain, appendicitis, ovarian cyst, intra-abdominal infection.  Right lower quadrant tenderness    The patient is evaluated history physical examination.  Patient was given pain medication and a workup ensued.  Patient had what appeared to be ovarian cysts bilaterally on her CT scan.  Confirm a possible dermoid in the ultrasound.  Clinically the patient is has a ovarian cyst.  She is established with OB.  Her pain was controlled.  I have referred back to OB.  She is aware of the possibility of dermoid in the need for follow up.  She verbalized agreement understanding with all and seems reliable            Amount and/or Complexity of Data Reviewed  External Data Reviewed: notes.     Details:  reviewed  Labs: ordered. Decision-making details documented in ED Course.  Radiology: ordered. Decision-making details documented in ED Course.    Risk  OTC drugs.  Prescription drug management.  Parenteral controlled substances.  Decision regarding hospitalization.                ED Medication(s):  Medications   ketorolac injection 15 mg (15 mg Intravenous Not Given 5/4/25 1720)   morphine injection 2 mg (2 mg Intravenous Given  5/4/25 1727)   morphine injection 4 mg (4 mg Intravenous Given 5/4/25 1752)       Discharge Medication List as of 5/4/2025  6:14 PM        START taking these medications    Details   HYDROcodone-acetaminophen (NORCO)  mg per tablet Take 1 tablet by mouth every 6 (six) hours as needed., Starting Sun 5/4/2025, Normal              Follow-up Information       Henrique Garcia MD.    Specialty: Obstetrics and Gynecology  Contact information:  [address] [place] [address]  [address]  [place] [address]  [phone]                                 Scribe Attestation:   Scribe #1: I performed the above scribed service and the documentation accurately describes the services I performed. I attest to the accuracy of the note.     Attending:   Physician Attestation Statement for Scribe #1: I, Andrzej Shoemaker MD, personally performed the services described in this documentation, as scribed by Filiberto Dunaway, in my presence, and it is both accurate and complete.       Scribe Attestation:   Scribe #2: I performed the above scribed service and the documentation accurately describes the services I performed. I attest to the accuracy of the note.    Attending Attestation:           Physician Attestation for Scribe:    Physician Attestation Statement for Scribe #2: I, Samir Bermudez Jr., MD, reviewed documentation, as scribed by Prasanth Hoffmann in my presence, and it is both accurate and complete. I also acknowledge and confirm the content of the note done by Columbaibe #1.           Clinical Impression       ICD-10-CM ICD-9-CM   1. Ovarian cyst, left  N83.202 620.2       Disposition:   Disposition: Discharged  Condition: Stable         Samir Bermudez Jr., MD  05/04/25 1903       Samir Bermudez Jr., MD  05/04/25 1903

## 2025-05-04 NOTE — DISCHARGE INSTRUCTIONS
Ibuprofen for pain Norco for breakthrough pain.  He had what appears to be a cyst on your left ovary which may be a dermoid.  Please follow up with her Ob at next available.  Return as needed for any worsening symptoms, problems, questions or concerns